# Patient Record
Sex: FEMALE
[De-identification: names, ages, dates, MRNs, and addresses within clinical notes are randomized per-mention and may not be internally consistent; named-entity substitution may affect disease eponyms.]

---

## 2017-12-21 ENCOUNTER — HOSPITAL (OUTPATIENT)
Dept: OTHER | Age: 37
End: 2017-12-21
Attending: INTERNAL MEDICINE

## 2018-06-12 ENCOUNTER — HOSPITAL (OUTPATIENT)
Dept: OTHER | Age: 38
End: 2018-06-12
Attending: PHYSICIAN ASSISTANT

## 2018-06-12 LAB
A/G RATIO_: 1.2
ABS LYMPH: 2 K/CUMM (ref 1–3.5)
ABS MONO: 0.5 K/CUMM (ref 0.1–0.8)
ABS NEUTRO: 5.2 K/CUMM (ref 2–8)
ALBUMIN: 4.4 G/DL (ref 3.5–5)
ALK PHOS: 74 UNIT/L (ref 50–124)
ALT/GPT: 9 UNIT/L (ref 0–55)
ANION GAP SERPL CALC-SCNC: 13 MEQ/L (ref 10–20)
AST/GOT: 16 UNIT/L (ref 5–34)
BASOPHIL: 1 % (ref 0–1)
BILI TOTAL: 0.5 MG/DL (ref 0.2–1)
BUN SERPL-MCNC: 15 MG/DL (ref 6–20)
CALCIUM: 9.7 MG/DL (ref 8.4–10.2)
CHLORIDE: 106 MEQ/L (ref 97–107)
CREATININE: 0.86 MG/DL (ref 0.6–1.3)
DIFF_TYPE?: NORMAL
EOSINOPHIL: 0 % (ref 0–6)
GLOBULIN_: 3.6 G/DL (ref 2–4.1)
GLUCOSE LVL: 87 MG/DL (ref 70–99)
HCT VFR BLD CALC: 42 % (ref 33–45)
HEMOLYSIS 2+: NEGATIVE
HGB BLD-MCNC: 13.7 G/DL (ref 11–15)
ICTERIC 4+: NEGATIVE
IMMATURE GRAN: 0.5 % (ref 0–0.3)
INSTR WBC: 7.8 K/CUMM (ref 4–11)
LIPASE LEVEL: 25 UNIT/L (ref 8–78)
LIPEMIC 3+: NEGATIVE
LYMPHOCYTE: 26 %
MCH RBC QN AUTO: 31 PG (ref 25–35)
MCHC RBC AUTO-ENTMCNC: 32 G/DL (ref 32–37)
MCV RBC AUTO: 95 FL (ref 78–97)
MONOCYTE: 6 %
NEUTROPHIL: 67 %
NRBC BLD MANUAL-RTO: 0 % (ref 0–0.2)
PLATELET: 286 K/CUMM (ref 150–450)
POTASSIUM: 3.9 MEQ/L (ref 3.5–5.1)
RBC # BLD: 4.45 M/CUMM (ref 3.7–5.2)
RDW: 11.8 % (ref 11.5–14.5)
SODIUM: 140 MEQ/L (ref 136–145)
TCO2: 25 MEQ/L (ref 19–29)
TOTAL PROTEIN: 8 G/DL (ref 6.4–8.3)
UA APPEAR: CLEAR
UA BILI: NEGATIVE
UA BLOOD: NEGATIVE
UA COLOR: YELLOW
UA GLUCOSE: NEGATIVE
UA KETONES: NEGATIVE
UA LEUK EST: NEGATIVE
UA NITRITE: NEGATIVE
UA PH: 6 (ref 5–7)
UA PROTEIN: NEGATIVE
UA SPEC GRAV: >=1.03 (ref 1.01–1.02)
UA UROBILINOGEN: 0.2 MG/DL (ref 0.2–1)
WBC # BLD: 7.8 K/CUMM (ref 4–11)

## 2018-09-17 ENCOUNTER — HOSPITAL (OUTPATIENT)
Dept: OTHER | Age: 38
End: 2018-09-17

## 2018-11-21 ENCOUNTER — OFFICE VISIT (OUTPATIENT)
Dept: FAMILY MEDICINE CLINIC | Facility: CLINIC | Age: 38
End: 2018-11-21

## 2018-11-21 VITALS
HEIGHT: 61 IN | BODY MASS INDEX: 28.32 KG/M2 | SYSTOLIC BLOOD PRESSURE: 110 MMHG | WEIGHT: 150 LBS | RESPIRATION RATE: 14 BRPM | OXYGEN SATURATION: 99 % | DIASTOLIC BLOOD PRESSURE: 72 MMHG | HEART RATE: 93 BPM

## 2018-11-21 DIAGNOSIS — Z13.29 SCREENING FOR ENDOCRINE, NUTRITIONAL, METABOLIC AND IMMUNITY DISORDER: ICD-10-CM

## 2018-11-21 DIAGNOSIS — Z13.228 SCREENING FOR ENDOCRINE, NUTRITIONAL, METABOLIC AND IMMUNITY DISORDER: ICD-10-CM

## 2018-11-21 DIAGNOSIS — G43.909 MIGRAINE WITHOUT STATUS MIGRAINOSUS, NOT INTRACTABLE, UNSPECIFIED MIGRAINE TYPE: Primary | ICD-10-CM

## 2018-11-21 DIAGNOSIS — Z80.3 FAMILY HISTORY OF BREAST CANCER: ICD-10-CM

## 2018-11-21 DIAGNOSIS — Z13.21 SCREENING FOR ENDOCRINE, NUTRITIONAL, METABOLIC AND IMMUNITY DISORDER: ICD-10-CM

## 2018-11-21 DIAGNOSIS — Z13.0 SCREENING FOR ENDOCRINE, NUTRITIONAL, METABOLIC AND IMMUNITY DISORDER: ICD-10-CM

## 2018-11-21 DIAGNOSIS — Z28.21 REFUSED INFLUENZA VACCINE: ICD-10-CM

## 2018-11-21 PROCEDURE — 99204 OFFICE O/P NEW MOD 45 MIN: CPT | Performed by: EMERGENCY MEDICINE

## 2018-11-21 RX ORDER — TOPIRAMATE 25 MG/1
25 TABLET ORAL 2 TIMES DAILY
Qty: 60 TABLET | Refills: 1 | Status: SHIPPED | OUTPATIENT
Start: 2018-11-21 | End: 2019-08-01 | Stop reason: ALTCHOICE

## 2018-11-21 NOTE — PROGRESS NOTES
Chief Complaint:   Patient presents with:  Migraine: NP, discuss migraines     HPI:   This is a 41year old female       23 [facility]  Dx with migraine HA in 2011. Has had HA on and off since childhood. Dx by a neurologist and was Rx's Topamax.  HA wel systems is negative except those stated as above    PHYSICAL EXAM:   /72   Pulse 93   Resp 14   Ht 61\"   Wt 150 lb   SpO2 99%   BMI 28.34 kg/m²  Estimated body mass index is 28.34 kg/m² as calculated from the following:    Height as of this encounte INSTRUCTIONS:    PLAN: Patient is a 60-year-old female presents today for recurrent headaches. She has had a history of migraine headaches in the past and they appear to be increasing in frequency.   No acute findings on exam today she is neurologically st

## 2018-11-21 NOTE — PATIENT INSTRUCTIONS
Thank you for choosing [facility]  To Do:  FOR MARYCARMEN ARIZMENDI    · Follow up for annual physical and PAP  · Have blood tests done  · Start Topamax  · Keep symtom diary  · Arrange for mammogram  · Arrange for genetic counseling  · Follow up i triggers  These are some of the things you can do to try to control triggers:  · Avoid triggers if you can. For example, stay clear of alcohol and foods that trigger your headaches. Use unscented household products. Keep regular sleep habits.  Manage stress for long-term prevention  Here are some suggestions:  · Exercise. Regular exercise can help prevent migraines and improve your health. (If exercise triggers your migraines, talk to your healthcare provider.)  · Keep regular habits.  Don’t skip or delay meal

## 2018-11-27 ENCOUNTER — TELEPHONE (OUTPATIENT)
Dept: FAMILY MEDICINE CLINIC | Facility: CLINIC | Age: 38
End: 2018-11-27

## 2018-11-27 DIAGNOSIS — Z80.3 FAMILY HISTORY OF BREAST CANCER IN MOTHER: ICD-10-CM

## 2018-11-27 DIAGNOSIS — Z80.3 FAMILY HISTORY OF BREAST CANCER: Primary | ICD-10-CM

## 2018-11-27 NOTE — TELEPHONE ENCOUNTER
Below message received from referral dept regarding genetic counseling referral:    Luis[facility] Clinical Staff; [facility] Referral Pool             Hello,     Referral has been cancelled due to no response and insufficient information

## 2018-12-24 ENCOUNTER — HOSPITAL ENCOUNTER (OUTPATIENT)
Age: 38
Discharge: HOME | End: 2018-12-24
Attending: EMERGENCY MEDICINE
Payer: COMMERCIAL

## 2018-12-24 ENCOUNTER — TELEPHONE (OUTPATIENT)
Dept: FAMILY MEDICINE CLINIC | Facility: CLINIC | Age: 38
End: 2018-12-24

## 2018-12-24 VITALS
HEART RATE: 68 BPM | BODY MASS INDEX: 27.38 KG/M2 | OXYGEN SATURATION: 99 % | HEIGHT: 61 IN | SYSTOLIC BLOOD PRESSURE: 117 MMHG | TEMPERATURE: 98 F | WEIGHT: 145 LBS | RESPIRATION RATE: 18 BRPM | DIASTOLIC BLOOD PRESSURE: 79 MMHG

## 2018-12-24 DIAGNOSIS — G43.709 CHRONIC MIGRAINE WITHOUT AURA WITHOUT STATUS MIGRAINOSUS, NOT INTRACTABLE: Primary | ICD-10-CM

## 2018-12-24 PROCEDURE — 99212 OFFICE O/P EST SF 10 MIN: CPT

## 2018-12-24 PROCEDURE — 99202 OFFICE O/P NEW SF 15 MIN: CPT

## 2018-12-24 RX ORDER — ONDANSETRON 4 MG/1
4 TABLET, FILM COATED ORAL EVERY 8 HOURS PRN
Qty: 30 TABLET | Refills: 0 | Status: SHIPPED | OUTPATIENT
Start: 2018-12-24

## 2018-12-24 NOTE — ED INITIAL ASSESSMENT (HPI)
C/o headache nausea and photo sensitivity started last night  Requesting work release  Took Excedrin

## 2018-12-24 NOTE — TELEPHONE ENCOUNTER
Patient Adrian Javier, states she gets bad migraines, provider previously told patient to call her and request medication if she feels nausea, she feels nausea due to migraine. Has had migraine since about 3pm on 12-23 took  topamax at 7am today.   Is re

## 2018-12-24 NOTE — ED PROVIDER NOTES
Patient Seen in: [facility] In [address] [place] [address]    History   Patient presents with:  Headache (neurologic)    Stated Complaint: migraine    HPI    Patient is a 15-year-old female who presents to immediate care complaining headache and nause Temp 98.2 °F (36.8 °C) (Oral)   Resp 18   Ht 154.9 cm (5' 1\")   Wt 65.8 kg   SpO2 99%   BMI 27.40 kg/m²         Physical Exam   Constitutional: She is oriented to person, place, and time. She appears well-nourished. HENT:   Head: Normocephalic.    Right

## 2018-12-24 NOTE — TELEPHONE ENCOUNTER
Patient experiencing migraine and requesting something for nausea. Dicussed migraines at [address] on 11/21/18. Please advise, thanks. OV scheduled for 12/28.

## 2019-01-16 ENCOUNTER — TELEPHONE (OUTPATIENT)
Dept: FAMILY MEDICINE CLINIC | Facility: CLINIC | Age: 39
End: 2019-01-16

## 2019-01-16 NOTE — TELEPHONE ENCOUNTER
Patient called the office and said he appointment was supposed to be done tomorrow not today.  I did inform the patient that the appointment has been rescheduled 4 times already so there must have been some confusion however I did have an appointment availa

## 2019-01-31 ENCOUNTER — LABORATORY ENCOUNTER (OUTPATIENT)
Dept: LAB | Age: 39
End: 2019-01-31
Attending: EMERGENCY MEDICINE
Payer: COMMERCIAL

## 2019-01-31 DIAGNOSIS — Z13.29 SCREENING FOR ENDOCRINE, NUTRITIONAL, METABOLIC AND IMMUNITY DISORDER: ICD-10-CM

## 2019-01-31 DIAGNOSIS — Z13.228 SCREENING FOR ENDOCRINE, NUTRITIONAL, METABOLIC AND IMMUNITY DISORDER: ICD-10-CM

## 2019-01-31 DIAGNOSIS — G43.909 MIGRAINE WITHOUT STATUS MIGRAINOSUS, NOT INTRACTABLE, UNSPECIFIED MIGRAINE TYPE: ICD-10-CM

## 2019-01-31 DIAGNOSIS — Z13.0 SCREENING FOR ENDOCRINE, NUTRITIONAL, METABOLIC AND IMMUNITY DISORDER: ICD-10-CM

## 2019-01-31 DIAGNOSIS — Z13.21 SCREENING FOR ENDOCRINE, NUTRITIONAL, METABOLIC AND IMMUNITY DISORDER: ICD-10-CM

## 2019-01-31 LAB
ALBUMIN SERPL-MCNC: 4.2 G/DL (ref 3.1–4.5)
ALBUMIN/GLOB SERPL: 1.1 {RATIO} (ref 1–2)
ALP LIVER SERPL-CCNC: 70 U/L (ref 37–98)
ALT SERPL-CCNC: 15 U/L (ref 14–54)
ANION GAP SERPL CALC-SCNC: 6 MMOL/L (ref 0–18)
AST SERPL-CCNC: 15 U/L (ref 15–41)
BASOPHILS # BLD AUTO: 0.03 X10(3) UL (ref 0–0.2)
BASOPHILS NFR BLD AUTO: 0.5 %
BILIRUB SERPL-MCNC: 0.7 MG/DL (ref 0.1–2)
BUN BLD-MCNC: 13 MG/DL (ref 8–20)
BUN/CREAT SERPL: 17.6 (ref 10–20)
CALCIUM BLD-MCNC: 9.3 MG/DL (ref 8.3–10.3)
CHLORIDE SERPL-SCNC: 106 MMOL/L (ref 101–111)
CHOLEST SMN-MCNC: 174 MG/DL (ref ?–200)
CO2 SERPL-SCNC: 26 MMOL/L (ref 22–32)
CREAT BLD-MCNC: 0.74 MG/DL (ref 0.55–1.02)
DEPRECATED RDW RBC AUTO: 40 FL (ref 35.1–46.3)
EOSINOPHIL # BLD AUTO: 0.05 X10(3) UL (ref 0–0.7)
EOSINOPHIL NFR BLD AUTO: 0.8 %
ERYTHROCYTE [DISTWIDTH] IN BLOOD BY AUTOMATED COUNT: 11.9 % (ref 11–15)
GLOBULIN PLAS-MCNC: 3.7 G/DL (ref 2.8–4.4)
GLUCOSE BLD-MCNC: 94 MG/DL (ref 70–99)
HCT VFR BLD AUTO: 41.5 % (ref 35–48)
HDLC SERPL-MCNC: 59 MG/DL (ref 40–59)
HGB BLD-MCNC: 13.5 G/DL (ref 12–16)
IMM GRANULOCYTES # BLD AUTO: 0.02 X10(3) UL (ref 0–1)
IMM GRANULOCYTES NFR BLD: 0.3 %
LDLC SERPL CALC-MCNC: 90 MG/DL (ref ?–100)
LYMPHOCYTES # BLD AUTO: 1.92 X10(3) UL (ref 1–4)
LYMPHOCYTES NFR BLD AUTO: 29.2 %
M PROTEIN MFR SERPL ELPH: 7.9 G/DL (ref 6.4–8.2)
MCH RBC QN AUTO: 29.7 PG (ref 26–34)
MCHC RBC AUTO-ENTMCNC: 32.5 G/DL (ref 31–37)
MCV RBC AUTO: 91.4 FL (ref 80–100)
MONOCYTES # BLD AUTO: 0.46 X10(3) UL (ref 0.1–1)
MONOCYTES NFR BLD AUTO: 7 %
NEUTROPHILS # BLD AUTO: 4.09 X10 (3) UL (ref 1.5–7.7)
NEUTROPHILS # BLD AUTO: 4.09 X10(3) UL (ref 1.5–7.7)
NEUTROPHILS NFR BLD AUTO: 62.2 %
NONHDLC SERPL-MCNC: 115 MG/DL (ref ?–130)
OSMOLALITY SERPL CALC.SUM OF ELEC: 286 MOSM/KG (ref 275–295)
PLATELET # BLD AUTO: 250 10(3)UL (ref 150–450)
POTASSIUM SERPL-SCNC: 4.4 MMOL/L (ref 3.6–5.1)
RBC # BLD AUTO: 4.54 X10(6)UL (ref 3.8–5.3)
SODIUM SERPL-SCNC: 138 MMOL/L (ref 136–144)
T4 FREE SERPL-MCNC: 0.7 NG/DL (ref 0.9–1.8)
TRIGL SERPL-MCNC: 125 MG/DL (ref 30–149)
TSI SER-ACNC: 8.05 MIU/ML (ref 0.35–5.5)
VLDLC SERPL CALC-MCNC: 25 MG/DL (ref 0–30)
WBC # BLD AUTO: 6.6 X10(3) UL (ref 4–11)

## 2019-01-31 PROCEDURE — 85025 COMPLETE CBC W/AUTO DIFF WBC: CPT

## 2019-01-31 PROCEDURE — 84439 ASSAY OF FREE THYROXINE: CPT

## 2019-01-31 PROCEDURE — 36415 COLL VENOUS BLD VENIPUNCTURE: CPT

## 2019-01-31 PROCEDURE — 80053 COMPREHEN METABOLIC PANEL: CPT

## 2019-01-31 PROCEDURE — 84443 ASSAY THYROID STIM HORMONE: CPT

## 2019-01-31 PROCEDURE — 80061 LIPID PANEL: CPT

## 2019-02-05 ENCOUNTER — TELEPHONE (OUTPATIENT)
Dept: FAMILY MEDICINE CLINIC | Facility: CLINIC | Age: 39
End: 2019-02-05

## 2019-02-05 NOTE — TELEPHONE ENCOUNTER
LVM for pt to call back to schedule f/u OV to discuss labs. PSR: If / when pt returns call, please schedule f/u with PCP. If pt insists, okay to lync me in triage. Thanks!

## 2019-02-05 NOTE — TELEPHONE ENCOUNTER
----- Message from Ashley De Anda MD sent at 2/4/2019 12:54 PM CST -----  Patient needs OV for discussion of labs

## 2019-02-07 NOTE — TELEPHONE ENCOUNTER
Detailed VM left for patient. I advised her that her thyroid labs are abnormal and she will need to schedule a follow up office visit to discuss a POC.

## 2019-02-09 ENCOUNTER — APPOINTMENT (OUTPATIENT)
Dept: ULTRASOUND IMAGING | Facility: HOSPITAL | Age: 39
End: 2019-02-09
Attending: EMERGENCY MEDICINE
Payer: COMMERCIAL

## 2019-02-09 ENCOUNTER — APPOINTMENT (OUTPATIENT)
Dept: GENERAL RADIOLOGY | Facility: HOSPITAL | Age: 39
End: 2019-02-09
Attending: EMERGENCY MEDICINE
Payer: COMMERCIAL

## 2019-02-09 ENCOUNTER — HOSPITAL ENCOUNTER (EMERGENCY)
Facility: HOSPITAL | Age: 39
Discharge: HOME | End: 2019-02-09
Attending: EMERGENCY MEDICINE
Payer: COMMERCIAL

## 2019-02-09 VITALS
HEART RATE: 72 BPM | WEIGHT: 145 LBS | RESPIRATION RATE: 14 BRPM | OXYGEN SATURATION: 98 % | HEIGHT: 61 IN | DIASTOLIC BLOOD PRESSURE: 63 MMHG | SYSTOLIC BLOOD PRESSURE: 116 MMHG | BODY MASS INDEX: 27.38 KG/M2 | TEMPERATURE: 98 F

## 2019-02-09 DIAGNOSIS — M77.12 EPICONDYLITIS, LATERAL, LEFT: Primary | ICD-10-CM

## 2019-02-09 PROCEDURE — 99284 EMERGENCY DEPT VISIT MOD MDM: CPT

## 2019-02-09 PROCEDURE — 93971 EXTREMITY STUDY: CPT | Performed by: EMERGENCY MEDICINE

## 2019-02-09 PROCEDURE — 73080 X-RAY EXAM OF ELBOW: CPT | Performed by: EMERGENCY MEDICINE

## 2019-02-09 RX ORDER — IBUPROFEN 800 MG/1
800 TABLET ORAL EVERY 8 HOURS PRN
Qty: 30 TABLET | Refills: 0 | Status: SHIPPED | OUTPATIENT
Start: 2019-02-09 | End: 2019-02-16

## 2019-02-09 RX ORDER — ACETAMINOPHEN, ASPIRIN AND CAFFEINE 250; 250; 65 MG/1; MG/1; MG/1
1 TABLET, FILM COATED ORAL EVERY 6 HOURS PRN
COMMUNITY
End: 2019-08-01 | Stop reason: ALTCHOICE

## 2019-02-09 NOTE — ED NOTES
Radiology at bedside for elbow xray at this time. Procedure tolerated well. Waiting for US at this time.

## 2019-02-09 NOTE — ED PROVIDER NOTES
Patient Seen in: [facility] Emergency Department    History   Patient presents with:  Pain (neurologic)    Stated Complaint: pain to left elbow x 1 week, now getting progressively worse    HPI    79-year-old female comes to the hospital complaint of RRR  lungs: CTAB  abd: Soft NT, ND,  NABS without rebound or guarding  Ext there is no obvious soft tissue swelling noted in the area of her left upper extremity. Is no erythema, warmth noted. Range of motion is spared.   She is tenderness greatest over t for one to two weeks. FINDINGS:  EXTREMITY:  Left upper extremity THROMBI:  None visible. COMPRESSION:  Normal compressibility, phasicity, and augmentation of the subclavian, jugular, axillary, brachial, basilic, and cephalic veins. OTHER:  Negative.

## 2019-02-09 NOTE — ED INITIAL ASSESSMENT (HPI)
Patient reports left arm pain progressing over past 1-2 weeks. Reports pain worse to bend of the elbow area, \"feels like it's in her vein\". Denies any injury. Full ROM. Denies taking anything for pain. Denies any swelling to area. No redness, no warmth.

## 2019-02-14 ENCOUNTER — OFFICE VISIT (OUTPATIENT)
Dept: FAMILY MEDICINE CLINIC | Facility: CLINIC | Age: 39
End: 2019-02-14

## 2019-02-14 ENCOUNTER — LAB ENCOUNTER (OUTPATIENT)
Dept: LAB | Age: 39
End: 2019-02-14
Attending: EMERGENCY MEDICINE
Payer: COMMERCIAL

## 2019-02-14 VITALS
WEIGHT: 154 LBS | DIASTOLIC BLOOD PRESSURE: 70 MMHG | RESPIRATION RATE: 14 BRPM | SYSTOLIC BLOOD PRESSURE: 118 MMHG | HEART RATE: 65 BPM | BODY MASS INDEX: 29 KG/M2 | OXYGEN SATURATION: 99 %

## 2019-02-14 DIAGNOSIS — E03.9 HYPOTHYROIDISM, UNSPECIFIED TYPE: Primary | ICD-10-CM

## 2019-02-14 DIAGNOSIS — E03.9 HYPOTHYROIDISM, UNSPECIFIED TYPE: ICD-10-CM

## 2019-02-14 DIAGNOSIS — M75.22 BICEPS TENDONITIS ON LEFT: ICD-10-CM

## 2019-02-14 LAB
T4 FREE SERPL-MCNC: 0.8 NG/DL (ref 0.8–1.7)
THYROGLOB SERPL-MCNC: 211 U/ML (ref ?–60)
THYROPEROXIDASE AB SERPL-ACNC: ABNORMAL U/ML (ref ?–60)
TSI SER-ACNC: 5.95 MIU/ML (ref 0.36–3.74)

## 2019-02-14 PROCEDURE — 86376 MICROSOMAL ANTIBODY EACH: CPT

## 2019-02-14 PROCEDURE — 84443 ASSAY THYROID STIM HORMONE: CPT

## 2019-02-14 PROCEDURE — 36415 COLL VENOUS BLD VENIPUNCTURE: CPT

## 2019-02-14 PROCEDURE — 84439 ASSAY OF FREE THYROXINE: CPT

## 2019-02-14 PROCEDURE — 86800 THYROGLOBULIN ANTIBODY: CPT

## 2019-02-14 PROCEDURE — 99214 OFFICE O/P EST MOD 30 MIN: CPT | Performed by: EMERGENCY MEDICINE

## 2019-02-14 RX ORDER — METHYLPREDNISOLONE 4 MG/1
TABLET ORAL
Qty: 1 PACKAGE | Refills: 0 | Status: SHIPPED | OUTPATIENT
Start: 2019-02-14 | End: 2019-06-21 | Stop reason: ALTCHOICE

## 2019-02-14 RX ORDER — LEVOTHYROXINE SODIUM 0.05 MG/1
50 TABLET ORAL
Qty: 30 TABLET | Refills: 1 | Status: SHIPPED | OUTPATIENT
Start: 2019-02-14 | End: 2019-04-21

## 2019-02-14 NOTE — PROGRESS NOTES
Chief Complaint:   Patient presents with:  Lab Results: F/u   ER F/U: LT arm and chest pain     HPI:   This is a 41year old female       LEFT ARM PAIN  C/O left arm pain. Points to antecubital area. Pain now radiates to left breast and chest area.  Seen in /70   Pulse 65   Resp 14   Wt 154 lb   SpO2 99%   BMI 29.10 kg/m²  Estimated body mass index is 29.1 kg/m² as calculated from the following:    Height as of 2/9/19: 61\". Weight as of this encounter: 154 lb. Vital signs reviewed. Appears stated today  · Repeat thyroid check in 3 months  · Follow up in 1 month for annual physical  ·

## 2019-02-14 NOTE — PATIENT INSTRUCTIONS
Thank you for choosing [facility]  To Do:  FOR MARYCARMEN ARIZMENDI    · Arrange for mammogram  · Arrange for physical therapy  · Take Medrol Dose pack (steroids), hold ibuprofen temporarily  · Restart ibuprofen after steroids.  Take OTC ibuprofen likely need to take a daily pill for the rest of your life. Tips for taking this medicine are given below. Home care  Tips for taking your medicine  · Take your thyroid hormone pills as prescribed by your healthcare provider.  This is most often 1 pill a d follow your healthcare professional's instructions. Treating Thyroid Problems    Your healthcare provider has diagnosed a thyroid problem and will work with you to create a treatment plan.  Even if you don’t have symptoms, getting proper care is impo ablation. This is the most common treatment for hyperthyroidism. It’s done by taking a pill or liquid dose of radioactive iodine. This treatment destroys the thyroid cells that are making too much hormone.  You may need daily thyroid hormone pills after [facility] complications. You will likely receive the iodine at the hospital and go home the same day. The risk from the radiation to yourself and others is very small. However, you may need to stay away from other people for several days.  It is most important to prisca swelling  · Applying cold packs to help relieve pain and swelling  · Trying stretches and other exercises to help with range of motion and strength  If these treatments don’t do enough to relieve symptoms, physical therapy may be helpful.  For severe sympto

## 2019-02-21 ENCOUNTER — TELEPHONE (OUTPATIENT)
Dept: FAMILY MEDICINE CLINIC | Facility: CLINIC | Age: 39
End: 2019-02-21

## 2019-02-21 NOTE — TELEPHONE ENCOUNTER
----- Message from Eliana Mederos MD sent at 2/20/2019  9:51 PM CST -----  Pt recently started on levothyroxine.  Recheck TSH in 3 months as instructed  Thyroid peroxidase and Thyroglobulin AB are high, consistent with Hashimoto's

## 2019-02-21 NOTE — TELEPHONE ENCOUNTER
Pt returned call. Spoke with pt regarding results and instructions listed below. Pt verbalizes understanding.

## 2019-02-27 ENCOUNTER — HOSPITAL ENCOUNTER (OUTPATIENT)
Dept: MAMMOGRAPHY | Age: 39
Discharge: HOME | End: 2019-02-27
Attending: EMERGENCY MEDICINE
Payer: COMMERCIAL

## 2019-02-27 DIAGNOSIS — Z80.3 FAMILY HISTORY OF BREAST CANCER: ICD-10-CM

## 2019-02-27 PROCEDURE — 77063 BREAST TOMOSYNTHESIS BI: CPT | Performed by: EMERGENCY MEDICINE

## 2019-02-27 PROCEDURE — 77067 SCR MAMMO BI INCL CAD: CPT | Performed by: EMERGENCY MEDICINE

## 2019-03-12 ENCOUNTER — TELEPHONE (OUTPATIENT)
Dept: FAMILY MEDICINE CLINIC | Facility: CLINIC | Age: 39
End: 2019-03-12

## 2019-03-12 NOTE — TELEPHONE ENCOUNTER
----- Message from Ramona Leonard MD sent at 3/12/2019  2:37 PM CDT -----  Mammogram unremarkable, repeat yearly    Pt has a family hx of breast Ca  Pls encourage her to follow up for genetic counseling, was referred in the past,

## 2019-03-12 NOTE — TELEPHONE ENCOUNTER
LM for patient of below from Dr. Charito Rose along with Genetic counselors information from 11-27-19 referral.

## 2019-04-16 ENCOUNTER — HOSPITAL ENCOUNTER (EMERGENCY)
Facility: HOSPITAL | Age: 39
Discharge: HOME | End: 2019-04-16
Attending: EMERGENCY MEDICINE
Payer: COMMERCIAL

## 2019-04-16 VITALS
SYSTOLIC BLOOD PRESSURE: 114 MMHG | DIASTOLIC BLOOD PRESSURE: 73 MMHG | BODY MASS INDEX: 27.38 KG/M2 | HEART RATE: 65 BPM | WEIGHT: 145 LBS | OXYGEN SATURATION: 99 % | TEMPERATURE: 99 F | RESPIRATION RATE: 16 BRPM | HEIGHT: 61 IN

## 2019-04-16 DIAGNOSIS — R51.9 ACUTE NONINTRACTABLE HEADACHE, UNSPECIFIED HEADACHE TYPE: Primary | ICD-10-CM

## 2019-04-16 PROCEDURE — 99284 EMERGENCY DEPT VISIT MOD MDM: CPT

## 2019-04-16 PROCEDURE — 96375 TX/PRO/DX INJ NEW DRUG ADDON: CPT

## 2019-04-16 PROCEDURE — 96361 HYDRATE IV INFUSION ADD-ON: CPT

## 2019-04-16 PROCEDURE — 96374 THER/PROPH/DIAG INJ IV PUSH: CPT

## 2019-04-16 RX ORDER — METOCLOPRAMIDE HYDROCHLORIDE 5 MG/ML
10 INJECTION INTRAMUSCULAR; INTRAVENOUS ONCE
Status: COMPLETED | OUTPATIENT
Start: 2019-04-16 | End: 2019-04-16

## 2019-04-16 RX ORDER — DEXAMETHASONE SODIUM PHOSPHATE 4 MG/ML
10 VIAL (ML) INJECTION ONCE
Status: COMPLETED | OUTPATIENT
Start: 2019-04-16 | End: 2019-04-16

## 2019-04-16 RX ORDER — KETOROLAC TROMETHAMINE 30 MG/ML
30 INJECTION, SOLUTION INTRAMUSCULAR; INTRAVENOUS ONCE
Status: COMPLETED | OUTPATIENT
Start: 2019-04-16 | End: 2019-04-16

## 2019-04-16 NOTE — ED PROVIDER NOTES
Patient Seen in: [facility] Emergency Department    History   Patient presents with:  Headache (neurologic)    Stated Complaint: headache     HPI    55-year-old female presents for evaluation of migraine.   Patient reports she developed a frontal h Normal rate, regular rhythm, normal heart sounds and intact distal pulses. Pulmonary/Chest: Effort normal and breath sounds normal. No respiratory distress. Abdominal: Soft. Bowel sounds are normal. She exhibits no distension. There is no tenderness.  [employer] [employer] to obtain a history: None    Medical Record Review: I personally reviewed available prior medical records for any recent pertinent discharge summaries, testing, and procedures and reviewed those reports. Complicating Factors:  The patient already has do

## 2019-04-21 DIAGNOSIS — E03.9 HYPOTHYROIDISM, UNSPECIFIED TYPE: ICD-10-CM

## 2019-04-22 RX ORDER — LEVOTHYROXINE SODIUM 0.05 MG/1
TABLET ORAL
Qty: 30 TABLET | Refills: 2 | Status: SHIPPED | OUTPATIENT
Start: 2019-04-22 | End: 2019-11-23

## 2019-06-18 ENCOUNTER — APPOINTMENT (OUTPATIENT)
Dept: LAB | Age: 39
End: 2019-06-18
Attending: EMERGENCY MEDICINE
Payer: COMMERCIAL

## 2019-06-18 DIAGNOSIS — E03.9 HYPOTHYROIDISM, UNSPECIFIED TYPE: ICD-10-CM

## 2019-06-18 PROCEDURE — 36415 COLL VENOUS BLD VENIPUNCTURE: CPT

## 2019-06-18 PROCEDURE — 84443 ASSAY THYROID STIM HORMONE: CPT

## 2019-06-21 PROBLEM — F41.1 GENERALIZED ANXIETY DISORDER: Status: ACTIVE | Noted: 2019-06-21

## 2019-06-21 NOTE — PATIENT INSTRUCTIONS
Thank you for choosing [facility]  To Do:  FOR [address]    · [id number] [address] to continue with OTC Tylenol or motrin for pain  · Start Propranolol for headache  · Start Zoloft for anxiety  · Follow up in 1 month  · Arrange for therapy and c tension, especially in the neck and shoulders  · Nausea and stomach problems  · Frequent headaches  · Feeling lightheaded  · Restlessness, trouble sleeping  · Feeling irritable and on edge all the time  How can generalized anxiety disorder be treated?   TAD treatment that’s best for you. Keep in mind that medicines can have side effects. Talk with your provider about any side effects that are bothering you. Changing the dose or type of medicine may help. Don’t stop taking medicine on your own.  That can cause whether you need to stay on them. Many people who have also had therapy may no longer need medicine to manage anxiety.   · You may need to stop taking medicine slowly to give your body time to adjust. When it’s time to stop, your healthcare provider will te

## 2019-06-21 NOTE — PROGRESS NOTES
Chief Complaint:   Patient presents with:  Lab Results: F/u thyroid   Migraine: F/u     HPI:   This is a 46year old female     MIGRAINE HA  Dx with neurology in 2011.   Topamax helped in the past. D/C because of S/E did not like how she felt, slow mentat 60 tablet Rfl: 1     No current facility-administered medications on file prior to visit.     Ready to quit: Not Answered  Counseling given: Not Answered         PROBLEM LIST     Patient Active Problem List:     Migraine without status migrainosus, not intr alterations in mentation occur patient instructed to stop medication and call office immediately. Patient will call if any symptoms worsen. Patient understood the above plan agreed and accepted risks. - Sertraline HCl (ZOLOFT) 50 MG Oral Tab;  Take 1 t

## 2019-08-01 NOTE — PATIENT INSTRUCTIONS
Thank you for choosing [facility]  To Do:  FOR MARYCARMEN ARIZMENDI    · Increase Sertraline to 100 mg  · Start Propanolol 10 mg twice a day  · May take Fioricet with codeine for severe headache  · Follow up in 1 month  · Arrange for ther unwanted thoughts and urges. You also may perform certain actions over and over. · Posttraumatic stress disorder. This occurs in people who have survived a terrible ordeal. It can cause nightmares and flashbacks about the event.   · Generalized anxiety dis one-on-one, and often takes place for a set number of sessions. CBT has two main parts:  · Cognitive therapy helps you identify the negative, irrational thoughts that occur with your anxiety.  You’ll learn to replace these with more positive, realistic thou gets. Instead, try to identify what might have triggered your anxiety. Then try to put this threat in perspective. · Keep in mind that you can’t control everything about a situation. Change what you can and let the rest take its course.   · Exercise — it’s that medicines can have side effects. Talk with your provider about any side effects that are bothering you. Changing the dose or type of medicine may help. Don’t stop taking medicine on your own. That can cause symptoms to come back.   · Anti-anxiety medic people who have also had therapy may no longer need medicine to manage anxiety. · You may need to stop taking medicine slowly to give your body time to adjust. When it’s time to stop, your healthcare provider will tell you more.  Remember: Never stop takin shoulders, neck, and scalp without knowing it. If this stress lasts long enough, you may develop a tension headache. It is not clear why migraines occur, but certain things called\" triggers\" can raise the risk of having a migraine attack.  Migraine kennedi any of these occur:  · Your head pain suddenly gets worse after sexual intercourse or strenuous activity  · Your head pain doesn’t get better within 24 hours  · You aren’t able to keep liquids down (repeated vomiting)  · Fever of 100.4ºF (38ºC) or higher, a thin cloth around a cold pack, a cold can of soda, or a bag of frozen vegetables. Apply this directly to the place where you feel pain (such as your neck or temples). · Use moist heat to relax your muscles. Take a warm shower or bath.  Or drape a warm, m the same amount of time. · Clement Hyattni is a type of training in which you learn to control certain physical functions and responses. This helps you learn to reduce muscle tension. Date Last Reviewed: 4/1/2018  © 6507-9065 [employer].

## 2019-08-01 NOTE — PROGRESS NOTES
Chief Complaint:   Patient presents with:  Migraine: Med f/u     HPI:   This is a 46year old female     [address] with anxiety  Not sleeping well because of anxiety. + poor sleep. Anxiety the same even after starting sertraline. Many stressors.   Ad disorder        REVIEW OF SYSTEMS:   Review of systems significant for headache  The rest of the review of systems is negative except those stated as above    PHYSICAL EXAM:   /62   Pulse 62   Resp 15   Wt 154 lb   SpO2 98%   BMI 29.10 kg/m²  Estimat Oral Tab; Take 1 tablet (10 mg total) by mouth 2 (two) times daily. Dispense: 60 tablet; Refill: 2    3.  Mild intermittent asthma without complication  Ok to continue with albuterol as needed      PATIENT INSTRUCTIONS:    · Increase Sertraline to 100 mg

## 2019-08-02 ENCOUNTER — TELEPHONE (OUTPATIENT)
Dept: FAMILY MEDICINE CLINIC | Facility: CLINIC | Age: 39
End: 2019-08-02

## 2019-09-04 NOTE — PATIENT INSTRUCTIONS
Thank you for choosing [facility]  To Do:  FOR MARYCARMEN ARIZMENDI    · Increase sertraline to 200 mg daily  · Recommend counseling and therapy.   · Start antibiotic for posible sinusitis  · Use flonase daily  · Trial of maxalt for migrai help you work through problems in your life, such as drug or alcohol dependence, that may be making your anxiety worse. Getting better takes time  Therapy will help you feel better and teach you skills to help manage anxiety long term.  But change doesn’t [address], [facility] [place] [employer]. All rights reserved. This information is not intended as a substitute for professional medical care. Always follow your healthcare professional's instructions.         Migraine Headache: Stages and Treatment    A migraine heada migraine pain. Using medicines  Work with your healthcare provider to find the right medicines for you. Medicines for migraine may relieve pain (analgesics), relieve nausea, or attack the migraine's root causes (migraine-specific medicines).   Rebound head across time zones can disrupt your sleep cycle, triggering headaches. · Hormones. Many women notice that migraines tend to happen at a certain point in their menstrual cycle. Birth control pills or hormone replacement therapy may also trigger migraines.

## 2019-09-04 NOTE — PROGRESS NOTES
Chief Complaint:   Patient presents with:  Sinus Problem: sinus pressure causing migranes    HPI:   This is a 46year old female     [address] with anxiety. On Sertraline 100 mg daily. Sleeping better. Panic attacks are less frequent.  Overal feels a times daily.  Disp: 60 tablet Rfl: 2   LEVOTHYROXINE SODIUM 50 MCG Oral Tab TAKE 1 TABLET(50 MCG) BY MOUTH BEFORE BREAKFAST Disp: 30 tablet Rfl: 2   Ondansetron HCl (ZOFRAN) 4 mg tablet Take 1 tablet (4 mg total) by mouth every 8 (eight) hours as needed for if not better. Dispense: 20 tablet; Refill: 0    3. Acute frontal sinusitis, recurrence not specified  - Amoxicillin-Pot Clavulanate 875-125 MG Oral Tab; Take 1 tablet by mouth 2 (two) times daily for 10 days. Dispense: 20 tablet;  Refill: 0  - Fluticason

## 2019-10-15 NOTE — PROGRESS NOTES
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   On Sertraline 200 mg daily, recently Overall, anxiety a little better with treatment -73-30 MG Oral Cap, Take 1-2 capsules by mouth every 4 (four) hours as needed for Headaches. Max of 6 tabs per day, Disp: 20 capsule, Rfl: 1  Propranolol HCl 10 MG Oral Tab, Take 1 tablet (10 mg total) by mouth 2 (two) times daily. , Disp: 60 tablet, hour(s)). ASSESSMENT AND PLAN:         1. Generalized anxiety disorder  Symptoms worse today after significant encounter at work. Patient on maximal dose of sertraline. Will change medication to venlafaxine. Wean off of sertraline.   Will refer fo

## 2019-10-15 NOTE — PATIENT INSTRUCTIONS
Thank you for choosing [facility]  To Do:  FOR MARYCARMEN ARIZMENDI    · Take sertraline 100 mg (1 tab) for 1 week then STOP  · Start Venlafaxine in 1 week after stopping sertraline.   · Arrange for therapy and counseling, Candelario Roman  · Cary Gutierrez anxiety. Other forms of therapy  Other therapy methods may work better for you than CBT. Or, you may move from CBT to another form of therapy as your treatment needs change. This may mean meeting with a therapist by yourself or in a group.  Therapy can als and nicotine, which can make anxiety symptoms worse. · Fight the temptation to turn to alcohol or unprescribed drugs for relief. They only make things worse in the long run. Date Last Reviewed: 1/1/2017  © 6753-1153 [employer] [place] [address].  [address]

## 2019-10-17 ENCOUNTER — TELEPHONE (OUTPATIENT)
Dept: FAMILY MEDICINE CLINIC | Facility: CLINIC | Age: 39
End: 2019-10-17

## 2019-10-17 NOTE — TELEPHONE ENCOUNTER
FMLA paperwork received in triage. Paperwork completed for patient as requested. [facility] ESCOBAR. Patient will sign ESCOBAR and complete her portion on the form before faxing. Form placed at  for patient.   Will evelina fo

## 2019-10-18 NOTE — TELEPHONE ENCOUNTER
Pt came in to the office yesterday and completed her portion of the paperwork and signed ESCOBAR. Forms were faxed today as the office did not have power yesterday. Copy placed at  for pt to  as requested. Additional copy sent to scan.

## 2019-10-21 NOTE — PROGRESS NOTES
Chief Complaint:   Patient presents with: Anxiety: F/u anxiety     HPI:   This is a 46year old female       ACUTE ANXIETY  Stil with anxiety. Has not started Venlafaxine because of tapering off of sertraline. Still unable to return to work.  Clonazepam tablet (10 mg total) by mouth 2 (two) times daily. , Disp: 60 tablet, Rfl: 2  LEVOTHYROXINE SODIUM 50 MCG Oral Tab, TAKE 1 TABLET(50 MCG) BY MOUTH BEFORE BREAKFAST, Disp: 30 tablet, Rfl: 2  Ondansetron HCl (ZOFRAN) 4 mg tablet, Take 1 tablet (4 mg total) by paperwork previously completed and signed. Okay to stay off of work off work until November 5 until recheck in the next 1 week. 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   Continue with clona

## 2019-10-21 NOTE — PATIENT INSTRUCTIONS
Thank you for choosing [facility]  To Do:  FOR [address]    · Follow up Nov 5th for recheck  · Off work till recheck  · Start Venlafaxine  · Continue with clonazepam as needed  · To ER if worse            Anxiety Reaction  Anxi amount of change that happens in your life at one time and take a break when you feel overloaded). · Unfortunately, many stressful situations can't be avoided. It is necessary to learn how to better manage stress.  There are many proven methods that will r sensations such as a pounding heartbeat. These feelings make you want to react to the threat. An anxiety response is normal in many situations. But when you have an anxiety disorder, the same response can occur at the wrong times.   Anxiety can be helpful is diagnosed seek mental healthcare. This is an illness and it can respond to treatment. Most types of anxiety disorders will respond to \"talk therapy\" and medicines.  Working with your doctor or other healthcare provider, you can develop skills to help y

## 2019-10-30 DIAGNOSIS — F41.1 GENERALIZED ANXIETY DISORDER: ICD-10-CM

## 2019-10-30 RX ORDER — CLONAZEPAM 0.5 MG/1
TABLET ORAL
Qty: 30 TABLET | Refills: 0 | Status: SHIPPED | OUTPATIENT
Start: 2019-10-30 | End: 2019-11-04

## 2019-10-30 NOTE — TELEPHONE ENCOUNTER
Medication(s) to Refill:   Requested Prescriptions     Pending Prescriptions Disp Refills   • CLONAZEPAM 0.5 MG Oral Tab [Pharmacy Med Name: CLONAZEPAM 0.5MG TABLETS] 30 tablet 0     Sig: TAKE 1 TO 2 TABLETS(0.5 TO 1 MG) BY MOUTH TWICE DAILY AS NEEDED FOR

## 2019-11-04 ENCOUNTER — TELEPHONE (OUTPATIENT)
Dept: FAMILY MEDICINE CLINIC | Facility: CLINIC | Age: 39
End: 2019-11-04

## 2019-11-04 ENCOUNTER — OFFICE VISIT (OUTPATIENT)
Dept: FAMILY MEDICINE CLINIC | Facility: CLINIC | Age: 39
End: 2019-11-04

## 2019-11-04 VITALS
HEART RATE: 88 BPM | RESPIRATION RATE: 16 BRPM | HEIGHT: 61 IN | BODY MASS INDEX: 29.07 KG/M2 | OXYGEN SATURATION: 99 % | SYSTOLIC BLOOD PRESSURE: 124 MMHG | DIASTOLIC BLOOD PRESSURE: 72 MMHG | WEIGHT: 154 LBS

## 2019-11-04 DIAGNOSIS — F41.9 ACUTE ANXIETY: ICD-10-CM

## 2019-11-04 PROCEDURE — 99214 OFFICE O/P EST MOD 30 MIN: CPT | Performed by: NURSE PRACTITIONER

## 2019-11-04 RX ORDER — CLONAZEPAM 0.5 MG/1
TABLET ORAL
Qty: 60 TABLET | Refills: 0 | Status: SHIPPED | OUTPATIENT
Start: 2019-11-04 | End: 2019-11-16

## 2019-11-04 RX ORDER — VENLAFAXINE HYDROCHLORIDE 150 MG/1
150 CAPSULE, EXTENDED RELEASE ORAL DAILY
Qty: 30 CAPSULE | Refills: 0 | Status: SHIPPED | OUTPATIENT
Start: 2019-11-04 | End: 2019-12-01

## 2019-11-04 NOTE — PROGRESS NOTES
Chief Complaint:   Patient presents with: Anxiety: follow up     HPI:   This is a 46year old female presenting for anxiety f/u. Has history of anxiety in the past that was well controlled until recent work incident on 10/14/19.  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  3 Inhaler 0   • Butalbital-APAP-Caff-Cod -00-30 MG Oral Cap Take 1-2 capsules by mouth every 4 (four) hours as n Neck supple. Cardiovascular: Normal rate, regular rhythm and intact distal pulses. Pulmonary/Chest: Effort normal and breath sounds normal.   Abdominal: Soft. Bowel sounds are normal.   Musculoskeletal: Normal range of motion.    Neurological: She is a

## 2019-11-04 NOTE — TELEPHONE ENCOUNTER
ESCOBAR was filled out and signed by patient at the office visit. Patient needed her work note to be faxed to [employer] in [facility] [employer] her current employee at fax number [phone]. Fax was sent and confirmation was received. Placed in [address] [place] [address] pending folder.

## 2019-11-05 ENCOUNTER — TELEPHONE (OUTPATIENT)
Dept: FAMILY MEDICINE CLINIC | Facility: CLINIC | Age: 39
End: 2019-11-05

## 2019-11-05 NOTE — TELEPHONE ENCOUNTER
Pt left message with Donnie Grady that there was an issue with her [place] paperwork because her employer will not accept the letter that was faxed over yesterday. Left message on pt's vm. Ok per [employer].  Stating that we would need her to provide us with new paper

## 2019-11-05 NOTE — TELEPHONE ENCOUNTER
Pt called back stating that she does not need her FMLA paperwork updated she needs the letter she received from [facility] 11/4/19 to match the letter written by Dr. Jennifer Herrera 10/21/19. Please advise if ok for revised note.  Thank you

## 2019-11-06 NOTE — TELEPHONE ENCOUNTER
Letter faxed as requested. Confirmation received. Copy placed at  for  as requested. vm left for pt.

## 2019-11-16 DIAGNOSIS — F41.9 ACUTE ANXIETY: ICD-10-CM

## 2019-11-18 NOTE — TELEPHONE ENCOUNTER
Received a message from [employer] checking the status of the prescription.  Please call with any questions at [phone]

## 2019-11-18 NOTE — TELEPHONE ENCOUNTER
Medication(s) to Refill:   Requested Prescriptions     Pending Prescriptions Disp Refills   • CLONAZEPAM 0.5 MG Oral Tab [Pharmacy Med Name: CLONAZEPAM 0.5MG TABLETS] 60 tablet 0     Sig: TAKE 1 TO 2 TABLETS(0.5 TO 1 MG) BY MOUTH TWICE DAILY AS NEEDED FOR

## 2019-11-18 NOTE — TELEPHONE ENCOUNTER
Patient called regarding this script, would like it filled as soon as possible. Please Advise. Thank you.

## 2019-11-18 NOTE — TELEPHONE ENCOUNTER
Patient called and spoke with PSR. Patient is out of medication. Patient has an office visit tomorrow but will be out of medication before her appointment. Please approve or deny pending Rx. Thank you!

## 2019-11-19 RX ORDER — CLONAZEPAM 0.5 MG/1
TABLET ORAL
Qty: 60 TABLET | Refills: 0 | Status: SHIPPED | OUTPATIENT
Start: 2019-11-19

## 2019-11-19 NOTE — PROGRESS NOTES
Chief Complaint:   Patient presents with: Anxiety: F/u    HPI:   This is a 46year old female     ANXIETY  Scheduled to start intensive outpatinet treatment with [facility].   sStill with flashbacks   Takes Clonazepam 2 x a day, take 2 tabs in bedt Disp: 30 tablet, Rfl: 2  Ondansetron HCl (ZOFRAN) 4 mg tablet, Take 1 tablet (4 mg total) by mouth every 8 (eight) hours as needed for Nausea., Disp: 30 tablet, Rfl: 0    No current facility-administered medications on file prior to visit.       Ready to [facility] psychiatry. Encouraged to continue follow-up with psychiatry for further medication adjustments per psychiatry.   Follow-up for annual physical in the next 1 month    PATIENT INSTRUCTIONS:    · Continue with outpatient program with [facility] tomorro

## 2019-11-19 NOTE — PATIENT INSTRUCTIONS
Thank you for choosing [facility]  To Do:  FOR Bahman ARIZMENDI    · Continue with outpatient program with [facility] tomorrow  · Further treatments medications as per psychiatry who she will see as part of the outpatient progra you than CBT. Or, you may move from CBT to another form of therapy as your treatment needs change. This may mean meeting with a therapist by yourself or in a group.  Therapy can also help you work through problems in your life, such as drug or alcohol depen to turn to alcohol or unprescribed drugs for relief. They only make things worse in the long run. Date Last Reviewed: 1/1/2017  © 8577-2777 [employer] [address] [facility], [facility] [place]. All rights reserved.  This information is not some disorders, the anxiety is way out of proportion to the threat that triggers it. With others, anxiety may occur even when there isn’t a clear threat or trigger. Who does it affect? Some people are more prone to persistent anxiety than others.  It tend [facility], [address]. All rights reserved. This information is not intended as a substitute for professional medical care. Always follow your healthcare professional's instructions.         Treating Posttraumatic Stress Disorder (PTSD) with be making your anxiety disorder worse. This includes things such as drug or alcohol abuse. Getting better with time  Therapy will help you feel better and teach you skills to help manage anxiety long-term. But change doesn't happen right away.  It takes a a can cause you to feel overwhelmed or become angry or upset more easily. Panic attacks (sudden, intense feelings of terror and a strong need to escape from wherever you are) can also occur. · You relive the event through nightmares and flashbacks.  During Psychiatric Association  [phone]  www.healthyminds. org  · American Psychological Association  www.apa.org/helpcenter  · Anxiety and Depression Association of [place]  www.adaa.org  · [address]  for PTSD  www

## 2019-11-20 PROBLEM — F43.10 PTSD (POST-TRAUMATIC STRESS DISORDER): Status: ACTIVE | Noted: 2019-11-20

## 2019-11-23 DIAGNOSIS — E03.9 HYPOTHYROIDISM, UNSPECIFIED TYPE: ICD-10-CM

## 2019-11-25 RX ORDER — LEVOTHYROXINE SODIUM 0.05 MG/1
TABLET ORAL
Qty: 30 TABLET | Refills: 1 | Status: SHIPPED | OUTPATIENT
Start: 2019-11-25 | End: 2020-02-13

## 2019-12-01 DIAGNOSIS — F41.9 ACUTE ANXIETY: ICD-10-CM

## 2019-12-02 RX ORDER — VENLAFAXINE HYDROCHLORIDE 150 MG/1
CAPSULE, EXTENDED RELEASE ORAL
Qty: 30 CAPSULE | Refills: 0 | Status: SHIPPED | OUTPATIENT
Start: 2019-12-02

## 2019-12-02 NOTE — TELEPHONE ENCOUNTER
Medication(s) to Refill:   Requested Prescriptions     Pending Prescriptions Disp Refills   • VENLAFAXINE HCL  MG Oral Capsule SR 24 Hr [Pharmacy Med Name: VENLAFAXINE ER 150MG CAPSULES] 30 capsule 0     Sig: TAKE 1 CAPSULE(150 MG) BY MOUTH DAILY

## 2019-12-11 ENCOUNTER — TELEPHONE (OUTPATIENT)
Dept: FAMILY MEDICINE CLINIC | Facility: CLINIC | Age: 39
End: 2019-12-11

## 2019-12-11 NOTE — TELEPHONE ENCOUNTER
ESCOBAR sent to scan stat for   [employer]/[address], [address] [facility]/[place], [address]

## 2019-12-31 DIAGNOSIS — F41.9 ACUTE ANXIETY: ICD-10-CM

## 2019-12-31 NOTE — TELEPHONE ENCOUNTER
Medication(s) to Refill:   Requested Prescriptions     Pending Prescriptions Disp Refills   • Venlafaxine HCl  MG Oral Capsule SR 24 Hr 30 capsule 0     Sig: TAKE 1 CAPSULE(150 MG) BY MOUTH DAILY         Reason for Medication Refill being sent to [facility]

## 2020-01-01 RX ORDER — VENLAFAXINE HYDROCHLORIDE 150 MG/1
CAPSULE, EXTENDED RELEASE ORAL
Qty: 30 CAPSULE | Refills: 0 | OUTPATIENT
Start: 2020-01-01

## 2020-01-01 NOTE — TELEPHONE ENCOUNTER
Venlafaxine not refilled  On last office visit patient was following up with an outpatient program and a psychiatrist from outside hospital.  Further refills and treatment medications per psychiatry.     Please inform patient

## 2020-01-24 ENCOUNTER — TELEPHONE (OUTPATIENT)
Dept: FAMILY MEDICINE CLINIC | Facility: CLINIC | Age: 40
End: 2020-01-24

## 2020-01-24 NOTE — TELEPHONE ENCOUNTER
LVm for patient to call the office. Just want to make sure she follow up with Psychiatrist and got her medicine.

## 2020-01-31 ENCOUNTER — TELEPHONE (OUTPATIENT)
Dept: FAMILY MEDICINE CLINIC | Facility: CLINIC | Age: 40
End: 2020-01-31

## 2020-02-12 DIAGNOSIS — E03.9 HYPOTHYROIDISM, UNSPECIFIED TYPE: ICD-10-CM

## 2020-02-13 RX ORDER — LEVOTHYROXINE SODIUM 50 UG/1
TABLET ORAL
Qty: 30 TABLET | Refills: 0 | Status: SHIPPED | OUTPATIENT
Start: 2020-02-13 | End: 2020-04-01

## 2020-03-31 DIAGNOSIS — E03.9 HYPOTHYROIDISM, UNSPECIFIED TYPE: ICD-10-CM

## 2020-04-01 RX ORDER — LEVOTHYROXINE SODIUM 50 UG/1
TABLET ORAL
Qty: 90 TABLET | Refills: 1 | Status: SHIPPED | OUTPATIENT
Start: 2020-04-01

## 2020-04-01 NOTE — TELEPHONE ENCOUNTER
Medication(s) to Refill:   Requested Prescriptions     Pending Prescriptions Disp Refills   • EUTHYROX 48 MCG Oral Tab [Pharmacy Med Name: Euthyrox 50 MCG Oral Tablet] 30 tablet 0     Sig: TAKE 1 TABLET BY MOUTH BEFORE BREAKFAST             Last Time Medic

## 2020-07-17 ENCOUNTER — LAB REQUISITION (OUTPATIENT)
Dept: ADMINISTRATIVE | Age: 40
End: 2020-07-17
Payer: COMMERCIAL

## 2020-07-17 DIAGNOSIS — R45.86 MOOD ALTERED: ICD-10-CM

## 2020-07-17 LAB
B-HCG UR QL: NEGATIVE
BILIRUB UR QL: NEGATIVE
COLOR UR: YELLOW
GLUCOSE UR-MCNC: NEGATIVE MG/DL
HGB UR QL STRIP.AUTO: NEGATIVE
KETONES UR-MCNC: NEGATIVE MG/DL
LEUKOCYTE ESTERASE UR QL STRIP.AUTO: NEGATIVE
NITRITE UR QL STRIP.AUTO: NEGATIVE
PH UR: 5 [PH] (ref 5–8)
PROT UR-MCNC: NEGATIVE MG/DL
SP GR UR STRIP: 1.02 (ref 1–1.03)
UROBILINOGEN UR STRIP-ACNC: <2

## 2020-07-17 PROCEDURE — 85025 COMPLETE CBC W/AUTO DIFF WBC: CPT | Performed by: OTHER

## 2020-07-17 PROCEDURE — 84443 ASSAY THYROID STIM HORMONE: CPT | Performed by: OTHER

## 2020-07-17 PROCEDURE — 82150 ASSAY OF AMYLASE: CPT | Performed by: OTHER

## 2020-07-17 PROCEDURE — 84100 ASSAY OF PHOSPHORUS: CPT | Performed by: OTHER

## 2020-07-17 PROCEDURE — 36415 COLL VENOUS BLD VENIPUNCTURE: CPT | Performed by: OTHER

## 2020-07-17 PROCEDURE — 81003 URINALYSIS AUTO W/O SCOPE: CPT | Performed by: OTHER

## 2020-07-17 PROCEDURE — 80053 COMPREHEN METABOLIC PANEL: CPT | Performed by: OTHER

## 2020-07-17 PROCEDURE — 80178 ASSAY OF LITHIUM: CPT | Performed by: OTHER

## 2020-07-17 PROCEDURE — 81025 URINE PREGNANCY TEST: CPT | Performed by: OTHER

## 2020-07-17 PROCEDURE — 83735 ASSAY OF MAGNESIUM: CPT | Performed by: OTHER

## 2020-07-17 PROCEDURE — 80061 LIPID PANEL: CPT | Performed by: OTHER

## 2020-07-20 LAB
ALBUMIN SERPL-MCNC: 4.1 G/DL (ref 3.4–5)
ALBUMIN/GLOB SERPL: 1.1 {RATIO} (ref 1–2)
ALP LIVER SERPL-CCNC: 63 U/L (ref 37–98)
ALT SERPL-CCNC: 21 U/L (ref 13–56)
AMYLASE SERPL-CCNC: 53 U/L (ref 25–115)
ANION GAP SERPL CALC-SCNC: 5 MMOL/L (ref 0–18)
AST SERPL-CCNC: 14 U/L (ref 15–37)
BASOPHILS # BLD AUTO: 0.04 X10(3) UL (ref 0–0.2)
BASOPHILS NFR BLD AUTO: 0.5 %
BILIRUB SERPL-MCNC: 0.5 MG/DL (ref 0.1–2)
BUN BLD-MCNC: 13 MG/DL (ref 7–18)
BUN/CREAT SERPL: 14.1 (ref 10–20)
CALCIUM BLD-MCNC: 8.9 MG/DL (ref 8.5–10.1)
CHLORIDE SERPL-SCNC: 109 MMOL/L (ref 98–112)
CHOLEST SMN-MCNC: 177 MG/DL (ref ?–200)
CO2 SERPL-SCNC: 27 MMOL/L (ref 21–32)
CREAT BLD-MCNC: 0.92 MG/DL (ref 0.55–1.02)
DEPRECATED RDW RBC AUTO: 44.9 FL (ref 35.1–46.3)
EOSINOPHIL # BLD AUTO: 0.09 X10(3) UL (ref 0–0.7)
EOSINOPHIL NFR BLD AUTO: 1.2 %
ERYTHROCYTE [DISTWIDTH] IN BLOOD BY AUTOMATED COUNT: 12.3 % (ref 11–15)
GLOBULIN PLAS-MCNC: 3.6 G/DL (ref 2.8–4.4)
GLUCOSE BLD-MCNC: 83 MG/DL (ref 70–99)
HAV IGM SER QL: 2.6 MG/DL (ref 1.6–2.6)
HCT VFR BLD AUTO: 43 % (ref 35–48)
HDLC SERPL-MCNC: 52 MG/DL (ref 40–59)
HGB BLD-MCNC: 13.6 G/DL (ref 12–16)
IMM GRANULOCYTES # BLD AUTO: 0.04 X10(3) UL (ref 0–1)
IMM GRANULOCYTES NFR BLD: 0.5 %
LDLC SERPL CALC-MCNC: 103 MG/DL (ref ?–100)
LITHIUM SERPL-SCNC: 0.2 MMOL/L (ref 0.6–1.2)
LYMPHOCYTES # BLD AUTO: 2.08 X10(3) UL (ref 1–4)
LYMPHOCYTES NFR BLD AUTO: 26.6 %
M PROTEIN MFR SERPL ELPH: 7.7 G/DL (ref 6.4–8.2)
MCH RBC QN AUTO: 31.1 PG (ref 26–34)
MCHC RBC AUTO-ENTMCNC: 31.6 G/DL (ref 31–37)
MCV RBC AUTO: 98.4 FL (ref 80–100)
MONOCYTES # BLD AUTO: 0.44 X10(3) UL (ref 0.1–1)
MONOCYTES NFR BLD AUTO: 5.6 %
NEUTROPHILS # BLD AUTO: 5.12 X10 (3) UL (ref 1.5–7.7)
NEUTROPHILS # BLD AUTO: 5.12 X10(3) UL (ref 1.5–7.7)
NEUTROPHILS NFR BLD AUTO: 65.6 %
NONHDLC SERPL-MCNC: 125 MG/DL (ref ?–130)
OSMOLALITY SERPL CALC.SUM OF ELEC: 291 MOSM/KG (ref 275–295)
PHOSPHATE SERPL-MCNC: 2.9 MG/DL (ref 2.5–4.9)
PLATELET # BLD AUTO: 325 10(3)UL (ref 150–450)
POTASSIUM SERPL-SCNC: 3.7 MMOL/L (ref 3.5–5.1)
RBC # BLD AUTO: 4.37 X10(6)UL (ref 3.8–5.3)
SODIUM SERPL-SCNC: 141 MMOL/L (ref 136–145)
TRIGL SERPL-MCNC: 109 MG/DL (ref 30–149)
TSI SER-ACNC: 0.73 MIU/ML (ref 0.36–3.74)
VLDLC SERPL CALC-MCNC: 22 MG/DL (ref 0–30)
WBC # BLD AUTO: 7.8 X10(3) UL (ref 4–11)

## 2020-07-26 ENCOUNTER — LAB REQUISITION (OUTPATIENT)
Dept: ADMINISTRATIVE | Age: 40
End: 2020-07-26
Payer: COMMERCIAL

## 2020-07-26 DIAGNOSIS — G43.909 MIGRAINE WITHOUT STATUS MIGRAINOSUS, NOT INTRACTABLE: ICD-10-CM

## 2020-07-27 LAB
ANION GAP SERPL CALC-SCNC: 2 MMOL/L (ref 0–18)
BUN BLD-MCNC: 13 MG/DL (ref 7–18)
BUN/CREAT SERPL: 15.1 (ref 10–20)
CALCIUM BLD-MCNC: 9.1 MG/DL (ref 8.5–10.1)
CHLORIDE SERPL-SCNC: 110 MMOL/L (ref 98–112)
CO2 SERPL-SCNC: 30 MMOL/L (ref 21–32)
CREAT BLD-MCNC: 0.86 MG/DL (ref 0.55–1.02)
GLUCOSE BLD-MCNC: 74 MG/DL (ref 70–99)
HAV IGM SER QL: 2.5 MG/DL (ref 1.6–2.6)
OSMOLALITY SERPL CALC.SUM OF ELEC: 293 MOSM/KG (ref 275–295)
PHOSPHATE SERPL-MCNC: 3.7 MG/DL (ref 2.5–4.9)
POTASSIUM SERPL-SCNC: 4.4 MMOL/L (ref 3.5–5.1)
SODIUM SERPL-SCNC: 142 MMOL/L (ref 136–145)

## 2020-07-27 PROCEDURE — 83735 ASSAY OF MAGNESIUM: CPT | Performed by: NURSE PRACTITIONER

## 2020-07-27 PROCEDURE — 80048 BASIC METABOLIC PNL TOTAL CA: CPT | Performed by: NURSE PRACTITIONER

## 2020-07-27 PROCEDURE — 84100 ASSAY OF PHOSPHORUS: CPT | Performed by: NURSE PRACTITIONER

## (undated) NOTE — LETTER
12/21/18        Donel Sacks  [address], [address]      Dear Yobany Leyva records indicate that you have outstanding lab work and or testing that was ordered for you and has not yet been completed:  Orders Placed Th

## (undated) NOTE — LETTER
Date: 11/6/2019    Patient Name: Kade Elaine          To Whom it may concern:       This certifies that Kade Elaine was seen in my office.  Patient is unable to return to work due to Acute anxiety and underlying Generalized

## (undated) NOTE — LETTER
Date: 2019    Patient Name: Megha Childress  : 06/10/1980    To Whom it may concern: The above patient was seen at the [facility] for treatment of a medical condition.     This patient should be excused from attending w

## (undated) NOTE — ED AVS SNAPSHOT
Doroteo Gruber   MRN: [id number]    Department:  [facility] Emergency Department   Date of Visit:  2/9/2019           Disclosure     Insurance plans vary and the physician(s) referred by the ER may not be covered by your plan.  Please contact tell this physician (or your personal doctor if your instructions are to return to your personal doctor) about any new or lasting problems. The primary care or specialist physician will see patients referred from the [facility] Emergency Department.  Fuentes Montanez

## (undated) NOTE — LETTER
Date & Time: 12/24/2018, 1:05 PM  Patient: Nan Thornton  Encounter Provider(s):    Becky Salazar MD       To Whom It May Concern: Nan Thornton was seen and treated in our department on 12/24/2018. She can return to work.     If you ha

## (undated) NOTE — LETTER
Date: 8/1/2019        Patient Name: Theremark anthony Slovenian          To Whom it may concern: The above patient was seen at the [facility] for treatment of a medical condition.     This patient should be excused from attending work on

## (undated) NOTE — LETTER
To Whom It May Concern: This certifies that Rossana Napier was seen in my office today. Patient is unable to return to work due to Acute anxiety and underlying Generalized anxiety disorder.  Symptoms are currently being addressed and roxanne

## (undated) NOTE — LETTER
06/13/19        Андрей Corbett  [address] [place] [address]      Dear Isaac Lemon records indicate that you have outstanding lab work and or testing that was ordered for you and has not yet been completed:  Orders

## (undated) NOTE — LETTER
Date: 2/14/2019    Patient Name: Kyle Machuca          To Whom it may concern: The above patient was seen at the [facility] for treatment of a medical condition. The patient may return to work 2/14/19 with no limitations.

## (undated) NOTE — LETTER
[employer] for Lm Spence     : 6/10/1980     Date: 2019  Provider:  Shy Us MD  Phone for doctor or clinic: ED [facility], [address], [facility] [address]     [facility]

## (undated) NOTE — LETTER
Date: 9/4/2019    Patient Name: Kyle Riggins          To Whom it may concern: The above patient was seen at the [facility] for treatment of a medical condition. This patient should be excused from missed work.       [facility]

## (undated) NOTE — LETTER
Date: 8/5/2019    Patient Name: Samuel Bowling          To Whom it may concern: The above patient was seen at the [facility] for treatment of a medical condition.     This patient should be excused from attending work 8/2/2019

## (undated) NOTE — LETTER
To Whom It May Concern: This certifies that Will Nava was seen in my office today. Patient is unable to work at this time due to acute anxiety and PTSD. Further restrictions and return to work status as per psychiatry.   Further roxanne

## (undated) NOTE — LETTER
February 9, 2019    Patient: Star Navarro   Date of Visit: 2/9/2019       To Whom It May Concern: Star Navarro was seen and treated in our emergency department on 2/9/2019.  She can return to work with these limitations: On 2/10/19 with

## (undated) NOTE — ED AVS SNAPSHOT
Андрей Corbett   MRN: [id number]    Department:  [facility] Emergency Department   Date of Visit:  4/16/2019           Disclosure     Insurance plans vary and the physician(s) referred by the ER may not be covered by your plan.  Pl CARE PHYSICIAN AT ONCE OR RETURN IMMEDIATELY TO THE EMERGENCY DEPARTMENT. If you have been prescribed any medication(s), please fill your prescription right away and begin taking the medication(s) as directed.   If you believe that any of the medications